# Patient Record
Sex: FEMALE | Race: WHITE | NOT HISPANIC OR LATINO | URBAN - METROPOLITAN AREA
[De-identification: names, ages, dates, MRNs, and addresses within clinical notes are randomized per-mention and may not be internally consistent; named-entity substitution may affect disease eponyms.]

---

## 2018-10-08 ENCOUNTER — INPATIENT (INPATIENT)
Facility: HOSPITAL | Age: 38
LOS: 1 days | Discharge: HOME | End: 2018-10-10
Attending: SPECIALIST | Admitting: SPECIALIST

## 2018-10-08 VITALS — TEMPERATURE: 98 F | DIASTOLIC BLOOD PRESSURE: 76 MMHG | HEART RATE: 83 BPM | SYSTOLIC BLOOD PRESSURE: 116 MMHG

## 2018-10-08 DIAGNOSIS — Z3A.00 WEEKS OF GESTATION OF PREGNANCY NOT SPECIFIED: ICD-10-CM

## 2018-10-08 DIAGNOSIS — O26.899 OTHER SPECIFIED PREGNANCY RELATED CONDITIONS, UNSPECIFIED TRIMESTER: ICD-10-CM

## 2018-10-08 RX ORDER — GLYCERIN ADULT
1 SUPPOSITORY, RECTAL RECTAL AT BEDTIME
Qty: 0 | Refills: 0 | Status: DISCONTINUED | OUTPATIENT
Start: 2018-10-08 | End: 2018-10-10

## 2018-10-08 RX ORDER — OXYCODONE HYDROCHLORIDE 5 MG/1
5 TABLET ORAL EVERY 4 HOURS
Qty: 0 | Refills: 0 | Status: DISCONTINUED | OUTPATIENT
Start: 2018-10-08 | End: 2018-10-10

## 2018-10-08 RX ORDER — ACETAMINOPHEN 500 MG
975 TABLET ORAL EVERY 6 HOURS
Qty: 0 | Refills: 0 | Status: COMPLETED | OUTPATIENT
Start: 2018-10-08 | End: 2019-09-06

## 2018-10-08 RX ORDER — IBUPROFEN 200 MG
600 TABLET ORAL EVERY 6 HOURS
Qty: 0 | Refills: 0 | Status: COMPLETED | OUTPATIENT
Start: 2018-10-08 | End: 2019-09-06

## 2018-10-08 RX ORDER — SIMETHICONE 80 MG/1
80 TABLET, CHEWABLE ORAL EVERY 6 HOURS
Qty: 0 | Refills: 0 | Status: DISCONTINUED | OUTPATIENT
Start: 2018-10-08 | End: 2018-10-10

## 2018-10-08 RX ORDER — OXYTOCIN 10 UNIT/ML
41.67 VIAL (ML) INJECTION
Qty: 20 | Refills: 0 | Status: DISCONTINUED | OUTPATIENT
Start: 2018-10-08 | End: 2018-10-09

## 2018-10-08 RX ORDER — SODIUM CHLORIDE 9 MG/ML
1000 INJECTION, SOLUTION INTRAVENOUS
Qty: 0 | Refills: 0 | Status: DISCONTINUED | OUTPATIENT
Start: 2018-10-08 | End: 2018-10-09

## 2018-10-08 RX ORDER — DIPHENHYDRAMINE HCL 50 MG
25 CAPSULE ORAL EVERY 6 HOURS
Qty: 0 | Refills: 0 | Status: DISCONTINUED | OUTPATIENT
Start: 2018-10-08 | End: 2018-10-10

## 2018-10-08 RX ORDER — LANOLIN
1 OINTMENT (GRAM) TOPICAL EVERY 6 HOURS
Qty: 0 | Refills: 0 | Status: DISCONTINUED | OUTPATIENT
Start: 2018-10-08 | End: 2018-10-10

## 2018-10-08 RX ORDER — HYDROCORTISONE 1 %
1 OINTMENT (GRAM) TOPICAL EVERY 4 HOURS
Qty: 0 | Refills: 0 | Status: DISCONTINUED | OUTPATIENT
Start: 2018-10-08 | End: 2018-10-09

## 2018-10-08 RX ORDER — OXYTOCIN 10 UNIT/ML
333.33 VIAL (ML) INJECTION
Qty: 20 | Refills: 0 | Status: COMPLETED | OUTPATIENT
Start: 2018-10-08

## 2018-10-08 RX ORDER — DIBUCAINE 1 %
1 OINTMENT (GRAM) RECTAL EVERY 4 HOURS
Qty: 0 | Refills: 0 | Status: DISCONTINUED | OUTPATIENT
Start: 2018-10-08 | End: 2018-10-10

## 2018-10-08 RX ORDER — KETOROLAC TROMETHAMINE 30 MG/ML
30 SYRINGE (ML) INJECTION ONCE
Qty: 0 | Refills: 0 | Status: DISCONTINUED | OUTPATIENT
Start: 2018-10-08 | End: 2018-10-09

## 2018-10-08 RX ORDER — OXYCODONE HYDROCHLORIDE 5 MG/1
5 TABLET ORAL
Qty: 0 | Refills: 0 | Status: DISCONTINUED | OUTPATIENT
Start: 2018-10-08 | End: 2018-10-10

## 2018-10-08 RX ORDER — DOCUSATE SODIUM 100 MG
100 CAPSULE ORAL
Qty: 0 | Refills: 0 | Status: DISCONTINUED | OUTPATIENT
Start: 2018-10-08 | End: 2018-10-10

## 2018-10-08 RX ORDER — PRAMOXINE HYDROCHLORIDE 150 MG/15G
1 AEROSOL, FOAM RECTAL EVERY 4 HOURS
Qty: 0 | Refills: 0 | Status: DISCONTINUED | OUTPATIENT
Start: 2018-10-08 | End: 2018-10-09

## 2018-10-08 RX ORDER — SODIUM CHLORIDE 9 MG/ML
3 INJECTION INTRAMUSCULAR; INTRAVENOUS; SUBCUTANEOUS EVERY 8 HOURS
Qty: 0 | Refills: 0 | Status: DISCONTINUED | OUTPATIENT
Start: 2018-10-08 | End: 2018-10-10

## 2018-10-08 RX ORDER — SODIUM CHLORIDE 9 MG/ML
1000 INJECTION, SOLUTION INTRAVENOUS ONCE
Qty: 0 | Refills: 0 | Status: DISCONTINUED | OUTPATIENT
Start: 2018-10-08 | End: 2018-10-09

## 2018-10-08 RX ORDER — TETANUS TOXOID, REDUCED DIPHTHERIA TOXOID AND ACELLULAR PERTUSSIS VACCINE, ADSORBED 5; 2.5; 8; 8; 2.5 [IU]/.5ML; [IU]/.5ML; UG/.5ML; UG/.5ML; UG/.5ML
0.5 SUSPENSION INTRAMUSCULAR ONCE
Qty: 0 | Refills: 0 | Status: DISCONTINUED | OUTPATIENT
Start: 2018-10-08 | End: 2018-10-10

## 2018-10-08 RX ORDER — AER TRAVELER 0.5 G/1
1 SOLUTION RECTAL; TOPICAL EVERY 4 HOURS
Qty: 0 | Refills: 0 | Status: DISCONTINUED | OUTPATIENT
Start: 2018-10-08 | End: 2018-10-10

## 2018-10-08 RX ORDER — MAGNESIUM HYDROXIDE 400 MG/1
30 TABLET, CHEWABLE ORAL
Qty: 0 | Refills: 0 | Status: DISCONTINUED | OUTPATIENT
Start: 2018-10-08 | End: 2018-10-10

## 2018-10-08 RX ADMIN — Medication 10 UNIT(S): at 23:24

## 2018-10-09 LAB
BLD GP AB SCN SERPL QL: NEGATIVE — SIGNIFICANT CHANGE UP
HCT VFR BLD CALC: 37.8 % — SIGNIFICANT CHANGE UP (ref 34.5–45)
HGB BLD-MCNC: 13.2 G/DL — SIGNIFICANT CHANGE UP (ref 11.5–15.5)
MCHC RBC-ENTMCNC: 32.7 PG — SIGNIFICANT CHANGE UP (ref 27–34)
MCHC RBC-ENTMCNC: 34.9 % — SIGNIFICANT CHANGE UP (ref 32–36)
MCV RBC AUTO: 93.6 FL — SIGNIFICANT CHANGE UP (ref 80–100)
NRBC # FLD: 0 — SIGNIFICANT CHANGE UP
PLATELET # BLD AUTO: 159 K/UL — SIGNIFICANT CHANGE UP (ref 150–400)
PMV BLD: 9.8 FL — SIGNIFICANT CHANGE UP (ref 7–13)
RBC # BLD: 4.04 M/UL — SIGNIFICANT CHANGE UP (ref 3.8–5.2)
RBC # FLD: 13.2 % — SIGNIFICANT CHANGE UP (ref 10.3–14.5)
RH IG SCN BLD-IMP: POSITIVE — SIGNIFICANT CHANGE UP
T PALLIDUM AB TITR SER: NEGATIVE — SIGNIFICANT CHANGE UP
WBC # BLD: 10.19 K/UL — SIGNIFICANT CHANGE UP (ref 3.8–10.5)
WBC # FLD AUTO: 10.19 K/UL — SIGNIFICANT CHANGE UP (ref 3.8–10.5)

## 2018-10-09 RX ORDER — IBUPROFEN 200 MG
1 TABLET ORAL
Qty: 0 | Refills: 0 | COMMUNITY
Start: 2018-10-09

## 2018-10-09 RX ORDER — PRAMOXINE HYDROCHLORIDE 150 MG/15G
1 AEROSOL, FOAM RECTAL EVERY 4 HOURS
Qty: 0 | Refills: 0 | Status: DISCONTINUED | OUTPATIENT
Start: 2018-10-09 | End: 2018-10-10

## 2018-10-09 RX ORDER — ACETAMINOPHEN 500 MG
3 TABLET ORAL
Qty: 0 | Refills: 0 | COMMUNITY
Start: 2018-10-09

## 2018-10-09 RX ORDER — OXYTOCIN 10 UNIT/ML
10 VIAL (ML) INJECTION ONCE
Qty: 0 | Refills: 0 | Status: COMPLETED | OUTPATIENT
Start: 2018-10-09 | End: 2018-10-08

## 2018-10-09 RX ORDER — ACETAMINOPHEN 500 MG
975 TABLET ORAL EVERY 6 HOURS
Qty: 0 | Refills: 0 | Status: DISCONTINUED | OUTPATIENT
Start: 2018-10-09 | End: 2018-10-10

## 2018-10-09 RX ORDER — OXYTOCIN 10 UNIT/ML
333.33 VIAL (ML) INJECTION
Qty: 20 | Refills: 0 | Status: DISCONTINUED | OUTPATIENT
Start: 2018-10-09 | End: 2018-10-09

## 2018-10-09 RX ORDER — IBUPROFEN 200 MG
600 TABLET ORAL EVERY 6 HOURS
Qty: 0 | Refills: 0 | Status: DISCONTINUED | OUTPATIENT
Start: 2018-10-09 | End: 2018-10-10

## 2018-10-09 RX ORDER — HYDROCORTISONE 1 %
1 OINTMENT (GRAM) TOPICAL EVERY 4 HOURS
Qty: 0 | Refills: 0 | Status: DISCONTINUED | OUTPATIENT
Start: 2018-10-09 | End: 2018-10-10

## 2018-10-09 RX ADMIN — Medication 600 MILLIGRAM(S): at 12:41

## 2018-10-09 RX ADMIN — Medication 600 MILLIGRAM(S): at 13:20

## 2018-10-09 RX ADMIN — Medication 975 MILLIGRAM(S): at 18:27

## 2018-10-09 RX ADMIN — Medication 975 MILLIGRAM(S): at 12:41

## 2018-10-09 RX ADMIN — Medication 600 MILLIGRAM(S): at 19:00

## 2018-10-09 RX ADMIN — Medication 600 MILLIGRAM(S): at 18:27

## 2018-10-09 RX ADMIN — Medication 975 MILLIGRAM(S): at 04:15

## 2018-10-09 RX ADMIN — OXYCODONE HYDROCHLORIDE 5 MILLIGRAM(S): 5 TABLET ORAL at 10:13

## 2018-10-09 RX ADMIN — Medication 30 MILLIGRAM(S): at 00:15

## 2018-10-09 RX ADMIN — Medication 600 MILLIGRAM(S): at 04:15

## 2018-10-09 RX ADMIN — Medication 125 MILLIUNIT(S)/MIN: at 01:13

## 2018-10-09 RX ADMIN — Medication 1 TABLET(S): at 12:41

## 2018-10-09 RX ADMIN — Medication 600 MILLIGRAM(S): at 05:00

## 2018-10-09 RX ADMIN — Medication 975 MILLIGRAM(S): at 13:20

## 2018-10-09 RX ADMIN — OXYCODONE HYDROCHLORIDE 5 MILLIGRAM(S): 5 TABLET ORAL at 09:42

## 2018-10-09 RX ADMIN — Medication 975 MILLIGRAM(S): at 19:00

## 2018-10-09 RX ADMIN — Medication 30 MILLIGRAM(S): at 00:00

## 2018-10-09 RX ADMIN — Medication 975 MILLIGRAM(S): at 05:00

## 2018-10-09 NOTE — DISCHARGE NOTE OB - CARE PROVIDER_API CALL
Gerson Espinal), Obstetrics and Gynecology  [address] [place] [address]  Phone: [phone]  Fax: [phone]

## 2018-10-09 NOTE — PROGRESS NOTE ADULT - SUBJECTIVE AND OBJECTIVE BOX
S: Patient doing well. Minimal lochia. Pain controlled.    O: Vital Signs Last 24 Hrs  T(C): 36.7 (09 Oct 2018 02:21), Max: 36.7 (09 Oct 2018 02:21)  T(F): 98.1 (09 Oct 2018 02:21), Max: 98.1 (09 Oct 2018 02:21)  HR: 79 (09 Oct 2018 02:21) (74 - 86)  BP: 117/68 (09 Oct 2018 02:21) (116/76 - 128/69)  BP(mean): 86 (09 Oct 2018 00:52) (86 - 92)  RR: 18 (09 Oct 2018 02:21) (18 - 18)  SpO2: 98% (09 Oct 2018 02:21) (98% - 98%)    Gen: NAD  Abd: soft, NT, ND, fundus firm below umbilicus  Lochia: moderate  Ext: no tenderness    Labs:                        13.2   10.19 )-----------( 159      ( 09 Oct 2018 01:13 )             37.8     Rh pos    A: 38y PPD#1 s/p  doing well.    Plan:  continue care  discharge instructions given

## 2018-10-09 NOTE — PROGRESS NOTE ADULT - SUBJECTIVE AND OBJECTIVE BOX
S: Patient doing well.     Pain controlled.  +OOB.  +void.    Tolerating PO.  Lochia WNL.    O: Vital Signs Last 24 Hrs  T(C): 36.9 (09 Oct 2018 09:58), Max: 37.1 (09 Oct 2018 05:25)  T(F): 98.4 (09 Oct 2018 09:58), Max: 98.7 (09 Oct 2018 05:25)  HR: 83 (09 Oct 2018 09:58) (74 - 86)  BP: 109/67 (09 Oct 2018 09:58) (93/59 - 128/69)  BP(mean): 86 (09 Oct 2018 00:52) (86 - 92)  RR: 18 (09 Oct 2018 09:58) (18 - 18)  SpO2: 98% (09 Oct 2018 09:58) (98% - 98%)      Pt without complaints  vital signs stable  Abdomen soft  fundus firm, non tender  extremities non tender  lochia wnl    Patient doing well  Routine post partum care    Labs:                        13.2   10.19 )-----------( 159      ( 09 Oct 2018 01:13 )             37.8       ABO Interpretation: A (10-09 @ 00:33)    Rh Interpretation: Positive (10-09 @ 00:33)    Antibody Screen Negative      A: 38y s/p  doing well.   -Continue routine postpartum care.  -Discharge planned for tomorrow

## 2018-10-09 NOTE — DISCHARGE NOTE OB - MEDICATION SUMMARY - MEDICATIONS TO TAKE
I will START or STAY ON the medications listed below when I get home from the hospital:    acetaminophen 325 mg oral tablet  -- 3 tab(s) by mouth every 6 hours  -- Indication: For nsd    ibuprofen 600 mg oral tablet  -- 1 tab(s) by mouth every 6 hours  -- Indication: For nsd I will START or STAY ON the medications listed below when I get home from the hospital:    ibuprofen 600 mg oral tablet  -- 1 tab(s) by mouth every 6 hours, As Needed  -- Indication: For pain    acetaminophen 325 mg oral tablet  -- 3 tab(s) by mouth every 6 hours, As Needed  -- Indication: For pain

## 2018-10-09 NOTE — DISCHARGE NOTE OB - PATIENT PORTAL LINK FT
You can access the [facility] Portal, offered by [facility], by registering with the following website: [email or web address]

## 2018-10-10 VITALS
TEMPERATURE: 98 F | HEART RATE: 74 BPM | DIASTOLIC BLOOD PRESSURE: 65 MMHG | RESPIRATION RATE: 18 BRPM | OXYGEN SATURATION: 98 % | SYSTOLIC BLOOD PRESSURE: 98 MMHG

## 2021-04-08 NOTE — DISCHARGE NOTE OB - FUNCTIONAL SCREEN CURRENT LEVEL: BATHING, MLM
Requested Prescriptions     Pending Prescriptions Disp Refills    metoprolol tartrate (LOPRESSOR) 50 mg tablet [Pharmacy Med Name: METOPROLOL TARTRATE 50 MG TAB] 180 Tab 1     Sig: TAKE 1 TABLET BY MOUTH TWICE A DAY    simvastatin (ZOCOR) 20 mg tablet [Pharmacy Med Name: SIMVASTATIN 20 MG TABLET] 90 Tab 1     Sig: TAKE 1 TABLET BY MOUTH EVERY DAY       [id number] Dinah Wood went through but Metoprolol did not. Pt called to get Metoprolol filled and is out. 0 = independent